# Patient Record
Sex: FEMALE | Race: WHITE | Employment: OTHER
[De-identification: names, ages, dates, MRNs, and addresses within clinical notes are randomized per-mention and may not be internally consistent; named-entity substitution may affect disease eponyms.]

---

## 2019-10-01 ENCOUNTER — HEALTH MAINTENANCE LETTER (OUTPATIENT)
Age: 74
End: 2019-10-01

## 2019-10-18 ENCOUNTER — HOSPITAL ENCOUNTER (EMERGENCY)
Facility: CLINIC | Age: 74
Discharge: HOME | End: 2019-10-18
Attending: EMERGENCY MEDICINE | Admitting: EMERGENCY MEDICINE
Payer: MEDICARE

## 2019-10-18 ENCOUNTER — TRANSFERRED RECORDS (OUTPATIENT)
Dept: HEALTH INFORMATION MANAGEMENT | Facility: CLINIC | Age: 74
End: 2019-10-18

## 2019-10-18 VITALS
HEART RATE: 97 BPM | TEMPERATURE: 97.9 F | SYSTOLIC BLOOD PRESSURE: 132 MMHG | RESPIRATION RATE: 18 BRPM | DIASTOLIC BLOOD PRESSURE: 75 MMHG | OXYGEN SATURATION: 87 %

## 2019-10-18 DIAGNOSIS — I49.9 CARDIAC ARRHYTHMIA, UNSPECIFIED CARDIAC ARRHYTHMIA TYPE: ICD-10-CM

## 2019-10-18 DIAGNOSIS — R42 EPISODIC LIGHTHEADEDNESS: ICD-10-CM

## 2019-10-18 DIAGNOSIS — R03.1 LOW BLOOD PRESSURE READING: ICD-10-CM

## 2019-10-18 LAB
ANION GAP SERPL CALCULATED.3IONS-SCNC: 5 MMOL/L (ref 3–14)
BASOPHILS # BLD AUTO: 0 10E9/L (ref 0–0.2)
BASOPHILS NFR BLD AUTO: 0.4 %
BUN SERPL-MCNC: 15 MG/DL (ref 7–30)
CALCIUM SERPL-MCNC: 8.5 MG/DL (ref 8.5–10.1)
CHLORIDE SERPL-SCNC: 100 MMOL/L (ref 94–109)
CO2 SERPL-SCNC: 28 MMOL/L (ref 20–32)
CREAT SERPL-MCNC: 0.65 MG/DL (ref 0.52–1.04)
DIFFERENTIAL METHOD BLD: ABNORMAL
EOSINOPHIL # BLD AUTO: 0.1 10E9/L (ref 0–0.7)
EOSINOPHIL NFR BLD AUTO: 0.8 %
ERYTHROCYTE [DISTWIDTH] IN BLOOD BY AUTOMATED COUNT: 15.9 % (ref 10–15)
GFR SERPL CREATININE-BSD FRML MDRD: 88 ML/MIN/{1.73_M2}
GLUCOSE SERPL-MCNC: 110 MG/DL (ref 70–99)
HCT VFR BLD AUTO: 33.1 % (ref 35–47)
HGB BLD-MCNC: 10.7 G/DL (ref 11.7–15.7)
IMM GRANULOCYTES # BLD: 0.1 10E9/L (ref 0–0.4)
IMM GRANULOCYTES NFR BLD: 0.6 %
INTERPRETATION ECG - MUSE: NORMAL
LYMPHOCYTES # BLD AUTO: 1.8 10E9/L (ref 0.8–5.3)
LYMPHOCYTES NFR BLD AUTO: 16.6 %
MCH RBC QN AUTO: 27.9 PG (ref 26.5–33)
MCHC RBC AUTO-ENTMCNC: 32.3 G/DL (ref 31.5–36.5)
MCV RBC AUTO: 86 FL (ref 78–100)
MONOCYTES # BLD AUTO: 1.1 10E9/L (ref 0–1.3)
MONOCYTES NFR BLD AUTO: 9.8 %
NEUTROPHILS # BLD AUTO: 7.7 10E9/L (ref 1.6–8.3)
NEUTROPHILS NFR BLD AUTO: 71.8 %
NRBC # BLD AUTO: 0 10*3/UL
NRBC BLD AUTO-RTO: 0 /100
PLATELET # BLD AUTO: 391 10E9/L (ref 150–450)
POTASSIUM SERPL-SCNC: 3.9 MMOL/L (ref 3.4–5.3)
RBC # BLD AUTO: 3.84 10E12/L (ref 3.8–5.2)
SODIUM SERPL-SCNC: 133 MMOL/L (ref 133–144)
TROPONIN I SERPL-MCNC: <0.015 UG/L (ref 0–0.04)
TROPONIN I SERPL-MCNC: <0.015 UG/L (ref 0–0.04)
WBC # BLD AUTO: 10.7 10E9/L (ref 4–11)

## 2019-10-18 PROCEDURE — 93005 ELECTROCARDIOGRAM TRACING: CPT

## 2019-10-18 PROCEDURE — 84484 ASSAY OF TROPONIN QUANT: CPT | Performed by: EMERGENCY MEDICINE

## 2019-10-18 PROCEDURE — 99284 EMERGENCY DEPT VISIT MOD MDM: CPT | Mod: 25

## 2019-10-18 PROCEDURE — 25000128 H RX IP 250 OP 636: Performed by: EMERGENCY MEDICINE

## 2019-10-18 PROCEDURE — 96360 HYDRATION IV INFUSION INIT: CPT

## 2019-10-18 PROCEDURE — 80048 BASIC METABOLIC PNL TOTAL CA: CPT | Performed by: EMERGENCY MEDICINE

## 2019-10-18 PROCEDURE — 96361 HYDRATE IV INFUSION ADD-ON: CPT

## 2019-10-18 PROCEDURE — 85025 COMPLETE CBC W/AUTO DIFF WBC: CPT | Performed by: EMERGENCY MEDICINE

## 2019-10-18 RX ORDER — SODIUM CHLORIDE 9 MG/ML
1000 INJECTION, SOLUTION INTRAVENOUS CONTINUOUS
Status: DISCONTINUED | OUTPATIENT
Start: 2019-10-18 | End: 2019-10-18 | Stop reason: HOSPADM

## 2019-10-18 RX ADMIN — SODIUM CHLORIDE 1000 ML: 9 INJECTION, SOLUTION INTRAVENOUS at 13:14

## 2019-10-18 RX ADMIN — SODIUM CHLORIDE 1000 ML: 9 INJECTION, SOLUTION INTRAVENOUS at 10:57

## 2019-10-18 ASSESSMENT — ENCOUNTER SYMPTOMS
NUMBNESS: 0
SHORTNESS OF BREATH: 0
WEAKNESS: 0
BLOOD IN STOOL: 0
NAUSEA: 0
HEADACHES: 1
VOMITING: 0

## 2019-10-18 NOTE — DISCHARGE INSTRUCTIONS
Discharge Instructions  Palpitations    Palpitations are an unusual awareness of your heartbeat. People often describe this as the heart skipping, fluttering, racing, irregular, or pounding. At this time, your provider has found no signs that your palpitations are due to a serious or life-threatening condition. However, sometimes there is a serious problem that does not show up right away.    Palpitations can be caused by caffeine, cigarettes, diet pills, energy drinks or supplements, other stimulants, and medications and street drugs. They can also be caused by anxiety, hormone conditions such as high thyroid, and other medical conditions. Sometimes they are a sign of abnormal rhythm in the heart. At this time, your provider did not find any dangerous cause of your symptoms.    Generally, every Emergency Department visit should have a follow-up clinic visit with either a primary or a specialty clinic/provider. Please follow-up as instructed by your emergency provider today.    Return to the Emergency Department if:  You get chest pain or tightness.  You are short of breath.  You get very weak or tired.  You pass out or faint.  Your heart rate is over 120 beats per minute for more than 10 minutes while you are resting.   You have anything else that worries you.    What can I do to help myself?  Fill any prescriptions the provider gave you and take them right away.   Follow your provider s instructions about the prescription medicines you are on. Sometimes the provider may tell you to stop taking a medicine or change the dose.  If you smoke, this may be a good time to quit! The less you can smoke, the better.  Do not use energy drinks, diet pills, or stimulants. Limit your use of caffeine.  If you were given a prescription for medicine here today, be sure to read all of the information (including the package insert) that comes with your prescription.  This will include important information about the medicine, its  side effects, and any warnings that you need to know about.  The pharmacist who fills the prescription can provide more information and answer questions you may have about the medicine.  If you have questions or concerns that the pharmacist cannot address, please call or return to the Emergency Department.     Remember that you can always come back to the Emergency Department if you are not able to see your regular provider in the amount of time listed above, if you get any new symptoms, or if there is anything that worries you.    Discharge Instructions  Dizziness (Lightheaded)  Today you were seen for dizziness.  Dizziness can be caused by many things and it can be very difficult to determine the cause of dizziness.  At this time, your provider has found no signs that your dizziness is due to a serious or life-threatening condition. However, sometimes there is a serious problem that does not show up right away, and it is important for you to follow up with your regular provider as instructed.  Generally, every Emergency Department visit should have a follow-up clinic visit with either a primary or a specialty clinic/provider. Please follow-up as instructed by your emergency provider today.      Return to the Emergency Department if:    You pass out (fainting or falling out), especially during exercise.    You develop chest pain, chest pressure or difficulty breathing.  Your feel an irregular heartbeat.  You have excessive vaginal bleeding, or blood in your stool or vomit (throw up).  You have a high fever.  Your symptoms get worse or more frequent.    If when you begin to feel dizzy or lightheaded, it is important to sit down or lay down immediately to prevent injury from falling.  If you were given a prescription for medicine here today, be sure to read all of the information (including the package insert) that comes with your prescription.  This will include important information about the medicine, its side  effects, and any warnings that you need to know about.  The pharmacist who fills the prescription can provide more information and answer questions you may have about the medicine.  If you have questions or concerns that the pharmacist cannot address, please call or return to the Emergency Department.   Remember that you can always come back to the Emergency Department if you are not able to see your regular provider in the amount of time listed above, if you get any new symptoms, or if there is anything that worries you.

## 2019-10-18 NOTE — ED TRIAGE NOTES
"Pt was at [facility] for physical with primary physician, \" I was up on exam table and began feeling lightheaded\". Helped to floor bp systolic 70s, Came back up within minutes to 150-170 systolic. \"feels better\". Denies chest pain or shortness of breath. History of stentsX3 May 1st. Fasting for blood work this morning. Orange juice in clinic.   "

## 2019-10-18 NOTE — ED PROVIDER NOTES
History     Chief Complaint:  Hypotension    HPI   Suki Barriga is a 74 year old female with a history of Lupus, gastroparesis, and recent stent (x3) placements in May who presents after episode of lightheadedness, hypotension. The patient has had a history of hypotension in the past and was taken off of her Metoprolol due to becoming hypotensive. The patient reports that she was at her primary care provider today and was up on the table when she started to feel near syncopal. She believes she would have lost consciousness if she had not lied down and drank orange juice. They report her blood pressure being 70 systolic. She notes that she was fasting due to having blood work done today. She notes she has had headaches 3-4 times a week for a few months. She states these are not similar to her migraines in the past and takes tylenol without relief. The patient denies chest pain, difficulty breathing, nausea, vomiting, recent illness, numbness, weakness, vision changes, and black or bloody stools.     Allergies:  Adhesive  Iodinated Diagnostic Agents  Metoclopramide  Oxycodone  Penicillins    Medications:    Combigan  Clenocin  Plavix  Aspirin 81 mg  Lipitor  Protonix  Deltasone  Neurontin  Elavil  Zofran    Past Medical History:    Hyperlipidemia  Gastro-oesophageal reflux disease   Diverticulosis  Lupus  Exertional angina  Dry eyes  Sjogren's syndrome  Gastroparesis  Cardiomyopathy  Hypercalcemia  Tremor    Past Surgical History:    Incisional hernia repair  Cholecystectomy  Cataract removal   Carpal tunnel release  Hysterectomy  Capsulotomy  Knee surgery (L)  Cervical spine surgery  Coronary angiogram    Family History:    Prostate cancer  Hypertension  AAA  Hypertension  Brain aneurysm  Rheumatoid arthritis  Colon cancer  Congestive heart failure    Social History:  Smoking status: Never  Alcohol use: No  PCP: TIFFANIE LEES  Marital Status:   [2]    Review of Systems   Eyes: Negative for visual  disturbance.   Respiratory: Negative for shortness of breath.    Cardiovascular: Negative for chest pain.   Gastrointestinal: Negative for blood in stool, nausea and vomiting.   Neurological: Positive for headaches. Negative for syncope, weakness and numbness.   All other systems reviewed and are negative.    Physical Exam     Patient Vitals for the past 24 hrs:   BP Temp Temp src Pulse Heart Rate Resp SpO2   10/18/19 1315 (!) 129/90 -- -- 81 -- -- 99 %   10/18/19 1300 (!) 140/93 -- -- 92 -- -- 100 %   10/18/19 1230 (!) 149/81 -- -- 98 81 -- 98 %   10/18/19 1215 (!) 150/83 -- -- 94 110 -- 99 %   10/18/19 1200 (!) 148/82 -- -- 115 85 -- 97 %   10/18/19 1105 130/77 -- -- -- 115 -- --   10/18/19 1104 (!) 145/68 -- -- -- 79 -- --   10/18/19 1052 (!) 146/91 97.9  F (36.6  C) Oral -- 104 18 99 %     Physical Exam  VS: Reviewed per above  HENT: Mucous membranes moist, no nuchal rigidity  EYES: sclera anicteric  CV: Rate as noted, regular rhythm.   RESP: Effort normal. Breath sounds are normal bilaterally.  GI: no tenderness/rebound/guarding, not distended.  NEURO: GCS 15, cranial nerves II through XII are intact, 5 out of 5 strength in all 4 extremities, sensation is intact light touch in all 4 extremities  MSK: No deformity of the extremities  SKIN: Warm and dry    Emergency Department Course   ECG (10:50:49):  Rate 82 bpm. MN interval 124. QRS duration 84. QT/QTc 362/422. P-R-T axes 84 58 72. Normal sinus rhythm. Normal ECG. No significant change compared to EKG dated 8/21/15. Interpreted at 1050 by Michael Bajwa MD.    Laboratory:  CBC: HGB 10.7 (L) o/w WNL (WBC 10.7, )  BMP: Glucose 110 (H) o/w WNL (Creatinine 0.65)  Troponin I (Collected 1056): <0.015  Troponin I (Collected 1319): <0.015    Interventions:  1057: NS 1L IV Bolus   1314: NS 1L IV Bolus     Emergency Department Course:  Past medical records, nursing notes, and vitals reviewed.  1050: I performed an exam of the patient and obtained history, as  documented above.  EKG performed, results above.  IV inserted and blood drawn.    1113: I rechecked the patient. Explained findings to the patient.     1403: I rechecked the patient. Findings and plan explained to the Patient. Patient discharged home with instructions regarding supportive care, medications, and reasons to return. The importance of close follow-up was reviewed.      Impression & Plan    Medical Decision Making:  Patient presents to the ER for evaluation of episode of near syncope, low blood pressure.  Vital signs on arrival notable for heart rate of 104, blood pressure 146/91. No obvious orthostatic changes but pt did experience episodes of what appears to be an episode of narrow complex regular tachycardia on telemetry intermittently during ER course.  Patient reports a history of SVT and follows with cardiology.  Exam without focal neuro deficits to suggest stroke, no ongoing dizziness either to suggest posterior circulation stroke.  Patient is currently feeling well.  Patient was fasting this morning in preparation of blood work at her primary care physician, certainly hypoglycemia/hypovolemia could have contributed to episode.  ECG here sinus rhythm without new ischemic changes.  Serial troponin testing negative.  No chest pain to suggest ACS.  Hemoglobin is stable and there is no history to suggest blood loss anemia.  Electrolytes are unrevealing, kidney function is wnl.  After IV fluid bolus, juice, patient ambulated in the ER with mild residual sx. Pt remained normotensive while in ER. Plan to have pt recheck with PCP/cardiology to discuss episodic narrow complex arrhythmia further. Close return precautions discussed with pt and family prior to discharge.      Diagnosis:    ICD-10-CM    1. Episodic lightheadedness R42    2. Low blood pressure reading R03.1    3. Cardiac arrhythmia, unspecified cardiac arrhythmia type I49.9      Disposition:  discharged to home    Pola Bezacarias  10/18/2019    [facility] EMERGENCY DEPARTMENT  I, Pola Little, am serving as a scribe at 10:50 AM on 10/18/2019 to document services personally performed by Michael Bajwa MD based on my observations and the provider's statements to me.     Michael Bajwa MD  10/18/19 4113

## 2019-10-18 NOTE — ED NOTES
Ambulated pt. Pt felt weak and dizzy while walking. Pt had a steady, slow gait. Pt noted slight heart flutter. MD notified.

## 2019-10-18 NOTE — ED NOTES
Bed: ED27  Expected date: 10/18/19  Expected time: 10:39 AM  Means of arrival: Ambulance  Comments:  BV1  73yo near syncope

## 2019-10-18 NOTE — ED AVS SNAPSHOT
[facility] Emergency Department  [address]  [facility] [address]  Phone:  [phone]  Fax:  [phone]                                    Suki Barriga   MRN: [id number]    Department:  [facility] Emergency Department   Date of Visit:  10/18/2019           After Visit Summary Signature Page    I have received my discharge instructions, and my questions have been answered. I have discussed any challenges I see with this plan with the nurse or doctor.    ..........................................................................................................................................  Patient/Patient Representative Signature      ..........................................................................................................................................  Patient Representative Print Name and Relationship to Patient    ..................................................               ................................................  Date                                   Time    ..........................................................................................................................................  Reviewed by Signature/Title    ...................................................              ..............................................  Date                                               Time          22EPIC Rev 08/18

## 2019-12-15 ENCOUNTER — HEALTH MAINTENANCE LETTER (OUTPATIENT)
Age: 74
End: 2019-12-15

## 2021-01-15 ENCOUNTER — HEALTH MAINTENANCE LETTER (OUTPATIENT)
Age: 76
End: 2021-01-15

## 2021-09-04 ENCOUNTER — HEALTH MAINTENANCE LETTER (OUTPATIENT)
Age: 76
End: 2021-09-04

## 2021-10-30 ENCOUNTER — HEALTH MAINTENANCE LETTER (OUTPATIENT)
Age: 76
End: 2021-10-30

## 2022-02-19 ENCOUNTER — HEALTH MAINTENANCE LETTER (OUTPATIENT)
Age: 77
End: 2022-02-19

## 2022-10-22 ENCOUNTER — HEALTH MAINTENANCE LETTER (OUTPATIENT)
Age: 77
End: 2022-10-22

## 2023-04-01 ENCOUNTER — HEALTH MAINTENANCE LETTER (OUTPATIENT)
Age: 78
End: 2023-04-01